# Patient Record
Sex: MALE | Race: WHITE
[De-identification: names, ages, dates, MRNs, and addresses within clinical notes are randomized per-mention and may not be internally consistent; named-entity substitution may affect disease eponyms.]

---

## 2020-09-09 ENCOUNTER — HOSPITAL ENCOUNTER (OUTPATIENT)
Dept: HOSPITAL 88 - CT | Age: 80
End: 2020-09-09
Attending: PSYCHIATRY & NEUROLOGY
Payer: MEDICARE

## 2020-09-09 DIAGNOSIS — R41.3: ICD-10-CM

## 2020-09-09 DIAGNOSIS — G89.29: ICD-10-CM

## 2020-09-09 DIAGNOSIS — Z95.0: ICD-10-CM

## 2020-09-09 DIAGNOSIS — M54.5: Primary | ICD-10-CM

## 2020-09-09 PROCEDURE — 70450 CT HEAD/BRAIN W/O DYE: CPT

## 2020-09-09 PROCEDURE — 72131 CT LUMBAR SPINE W/O DYE: CPT

## 2020-09-09 NOTE — DIAGNOSTIC IMAGING REPORT
Examination: CT head without contrast

Clinical Indication: Dizziness; balance problems; memory issues.

Technique: Transaxial noncontrast images from the skull base through the vertex

were obtained. Sagittal and coronal reformatted images were done.

Dose modulation, iterative reconstruction, and/or weight based adjustment of

the mA/kV was utilized to reduce the radiation dose to as low as reasonably

achievable. 

Comparison: Brain CT dated 10/23/2016.



Findings:



Scalp: No abnormalities.

Bones: Intact. No fractures.  No blastic or lytic lesions. 



Brain sulci: Appropriate for patient's age.

Ventricles: Normal in size and configuration. No hydrocephalus. 



Extra-axial space:

No abnormalities.



Parenchyma: 

There are patchy areas of low-attenuation within subcortical and

periventricular white matter, nonspecific, but could represent microvascular

ischemic disease.

No masses, hemorrhage, or acute or chronic cortical based vascular insults.



Suprasellar region: No abnormalities.

Craniocervical junction: The foramen magnum is patent.  No Chiari one

malformation.



Impression:



1.  No new or acute intracranial finding when compared to prior brain CT dated

10/12/2016.



2.  Unchanged chronic microvascular ischemic change.



Signed by: Dr. Angela Dyson M.D. on 9/9/2020 11:33 AM

## 2020-09-09 NOTE — DIAGNOSTIC IMAGING REPORT
Examination: CT LUMBAR SPINE WO CONTRAST



History: Chronic low back pain

Comparison studies: MRI lumbar 9/17/2009



Technique: 

Axial images were obtained through the lumbar spine from L1.

Coronal and sagittal reconstructions obtained from the axial data.

Dose modulation, iterative reconstruction, and/or weight based adjustment of

the mA/kV was utilized to reduce the radiation dose to as low as reasonably

achievable. 

Intravenous contrast: None



Findings:



The usual 5 non-rib bearing lumbar vertebral bodies are present.

Alignment: Normal lordosis.  No scoliosis.

Soft tissues: No abnormalities.

Paraspinal muscles: Unremarkable.

Sacroiliac joints: No degenerative changes.



Vertebrae:  

No fractures, infection or neoplasm.



Degenerative changes:



L1-L2:

Mild intervertebral disc space narrowing.

Diffuse disc bulge. No foraminal or canal stenosis.



L2-L3:

Diffuse disc bulge. No foraminal or canal stenosis.



L3-L4:

Diffuse disc bulge result in mild bilateral foraminal narrowing. No canal

stenosis.



L4-L5:.

Mild intervertebral disc space narrowing

Diffuse disc bulge. No foraminal or canal stenosis.



L5-S1:

Severe intervertebral disc space narrowing

Interval progression of degenerative change with diffuse disc osteophyte

complex and bilateral facet arthropathy result in moderate bilateral foraminal

narrowing. No canal stenosis.



IMPRESSION:



1.  Mild interval progression of degenerative change from L1-L2 through L5-S1

with new moderate bilateral foraminal narrowing at L5-S1. 



2.  No canal stenosis.





Signed by: Dr. Angela Dyson M.D. on 9/9/2020 11:58 AM

## 2020-09-29 ENCOUNTER — HOSPITAL ENCOUNTER (OUTPATIENT)
Dept: HOSPITAL 88 - ER | Age: 80
Discharge: HOME | End: 2020-09-29
Attending: PLASTIC SURGERY
Payer: MEDICARE

## 2020-09-29 VITALS — SYSTOLIC BLOOD PRESSURE: 136 MMHG | DIASTOLIC BLOOD PRESSURE: 85 MMHG

## 2020-09-29 VITALS — HEIGHT: 76 IN | BODY MASS INDEX: 25.82 KG/M2 | WEIGHT: 212 LBS

## 2020-09-29 DIAGNOSIS — Z95.810: ICD-10-CM

## 2020-09-29 DIAGNOSIS — Z95.9: ICD-10-CM
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
## 2020-09-29 DIAGNOSIS — I10: ICD-10-CM

## 2020-09-29 DIAGNOSIS — S62.637B: ICD-10-CM
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
## 2020-09-29 DIAGNOSIS — Z88.8: ICD-10-CM

## 2020-09-29 DIAGNOSIS — H91.90: ICD-10-CM

## 2020-09-29 DIAGNOSIS — S67.197A: Primary | ICD-10-CM
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
## 2020-09-29 DIAGNOSIS — I48.91: ICD-10-CM

## 2020-09-29 DIAGNOSIS — Z11.59: ICD-10-CM

## 2020-09-29 DIAGNOSIS — Z91.041: ICD-10-CM

## 2020-09-29 DIAGNOSIS — Z87.891: ICD-10-CM
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
## 2020-09-29 DIAGNOSIS — Z88.6: ICD-10-CM

## 2020-09-29 DIAGNOSIS — I44.0: ICD-10-CM

## 2020-09-29 LAB
ALBUMIN SERPL-MCNC: 4.3 G/DL (ref 3.5–5)
ALBUMIN/GLOB SERPL: 1.4 {RATIO} (ref 0.8–2)
ALP SERPL-CCNC: 60 IU/L (ref 40–150)
ALT SERPL-CCNC: 12 IU/L (ref 0–55)
ANION GAP SERPL CALC-SCNC: 13.6 MMOL/L (ref 8–16)
BASOPHILS # BLD AUTO: 0.1 10*3/UL (ref 0–0.1)
BASOPHILS NFR BLD AUTO: 0.8 % (ref 0–1)
BUN SERPL-MCNC: 15 MG/DL (ref 7–26)
BUN/CREAT SERPL: 14 (ref 6–25)
CALCIUM SERPL-MCNC: 9 MG/DL (ref 8.4–10.2)
CHLORIDE SERPL-SCNC: 104 MMOL/L (ref 98–107)
CO2 SERPL-SCNC: 27 MMOL/L (ref 22–29)
DEPRECATED NEUTROPHILS # BLD AUTO: 3.2 10*3/UL (ref 2.1–6.9)
EGFRCR SERPLBLD CKD-EPI 2021: > 60 ML/MIN (ref 60–?)
EOSINOPHIL # BLD AUTO: 0.3 10*3/UL (ref 0–0.4)
EOSINOPHIL NFR BLD AUTO: 4.6 % (ref 0–6)
ERYTHROCYTE [DISTWIDTH] IN CORD BLOOD: 15.1 % (ref 11.7–14.4)
GLOBULIN PLAS-MCNC: 3.1 G/DL (ref 2.3–3.5)
GLUCOSE SERPLBLD-MCNC: 127 MG/DL (ref 74–118)
HCT VFR BLD AUTO: 43.4 % (ref 38.2–49.6)
HGB BLD-MCNC: 15.1 G/DL (ref 14–18)
LYMPHOCYTES # BLD: 1.9 10*3/UL (ref 1–3.2)
LYMPHOCYTES NFR BLD AUTO: 29.5 % (ref 18–39.1)
MCH RBC QN AUTO: 32.1 PG (ref 28–32)
MCHC RBC AUTO-ENTMCNC: 34.8 G/DL (ref 31–35)
MCV RBC AUTO: 92.1 FL (ref 81–99)
MONOCYTES # BLD AUTO: 0.9 10*3/UL (ref 0.2–0.8)
MONOCYTES NFR BLD AUTO: 14.4 % (ref 4.4–11.3)
NEUTS SEG NFR BLD AUTO: 50.5 % (ref 38.7–80)
PLATELET # BLD AUTO: 192 X10E3/UL (ref 140–360)
POTASSIUM SERPL-SCNC: 3.6 MMOL/L (ref 3.5–5.1)
RBC # BLD AUTO: 4.71 X10E6/UL (ref 4.3–5.7)
SODIUM SERPL-SCNC: 141 MMOL/L (ref 136–145)

## 2020-09-29 PROCEDURE — 85025 COMPLETE CBC W/AUTO DIFF WBC: CPT

## 2020-09-29 PROCEDURE — 73140 X-RAY EXAM OF FINGER(S): CPT

## 2020-09-29 PROCEDURE — 93005 ELECTROCARDIOGRAM TRACING: CPT

## 2020-09-29 PROCEDURE — 99284 EMERGENCY DEPT VISIT MOD MDM: CPT

## 2020-09-29 PROCEDURE — 80053 COMPREHEN METABOLIC PANEL: CPT

## 2020-09-29 PROCEDURE — 90714 TD VACC NO PRESV 7 YRS+ IM: CPT

## 2020-09-29 PROCEDURE — 26765 TREAT FINGER FRACTURE EACH: CPT

## 2020-09-29 PROCEDURE — 36415 COLL VENOUS BLD VENIPUNCTURE: CPT

## 2020-09-29 PROCEDURE — 90471 IMMUNIZATION ADMIN: CPT

## 2021-04-15 ENCOUNTER — HOSPITAL ENCOUNTER (OUTPATIENT)
Dept: HOSPITAL 88 - CT | Age: 81
End: 2021-04-15
Attending: PSYCHIATRY & NEUROLOGY
Payer: MEDICARE

## 2021-04-15 DIAGNOSIS — R47.01: Primary | ICD-10-CM

## 2021-04-15 DIAGNOSIS — R41.3: ICD-10-CM

## 2021-04-15 DIAGNOSIS — R29.818: ICD-10-CM

## 2021-04-15 DIAGNOSIS — Z95.0: ICD-10-CM

## 2021-04-15 PROCEDURE — 70450 CT HEAD/BRAIN W/O DYE: CPT

## 2021-12-20 LAB
BASOPHILS # BLD AUTO: 0.1 10*3/UL (ref 0–0.1)
BASOPHILS NFR BLD AUTO: 0.9 % (ref 0–1)
DEPRECATED NEUTROPHILS # BLD AUTO: 3.8 10*3/UL (ref 2.1–6.9)
EOSINOPHIL # BLD AUTO: 0.3 10*3/UL (ref 0–0.4)
EOSINOPHIL NFR BLD AUTO: 3.5 % (ref 0–6)
ERYTHROCYTE [DISTWIDTH] IN CORD BLOOD: 14.8 % (ref 11.7–14.4)
HCT VFR BLD AUTO: 41.8 % (ref 38.2–49.6)
HGB BLD-MCNC: 14.3 G/DL (ref 14–18)
LYMPHOCYTES # BLD: 2.4 10*3/UL (ref 1–3.2)
LYMPHOCYTES NFR BLD AUTO: 31.4 % (ref 18–39.1)
MCH RBC QN AUTO: 32.5 PG (ref 28–32)
MCHC RBC AUTO-ENTMCNC: 34.2 G/DL (ref 31–35)
MCV RBC AUTO: 95 FL (ref 81–99)
MONOCYTES # BLD AUTO: 1.1 10*3/UL (ref 0.2–0.8)
MONOCYTES NFR BLD AUTO: 14.5 % (ref 4.4–11.3)
NEUTS SEG NFR BLD AUTO: 49.4 % (ref 38.7–80)
PLATELET # BLD AUTO: 183 X10E3/UL (ref 140–360)
RBC # BLD AUTO: 4.4 X10E6/UL (ref 4.3–5.7)

## 2021-12-22 ENCOUNTER — HOSPITAL ENCOUNTER (OUTPATIENT)
Dept: HOSPITAL 88 - OR | Age: 81
Discharge: HOME | End: 2021-12-22
Attending: INTERNAL MEDICINE
Payer: MEDICARE

## 2021-12-22 VITALS — DIASTOLIC BLOOD PRESSURE: 88 MMHG | SYSTOLIC BLOOD PRESSURE: 141 MMHG

## 2021-12-22 DIAGNOSIS — Z88.8: ICD-10-CM

## 2021-12-22 DIAGNOSIS — Z20.822: ICD-10-CM

## 2021-12-22 DIAGNOSIS — Z79.82: ICD-10-CM

## 2021-12-22 DIAGNOSIS — Z79.899: ICD-10-CM

## 2021-12-22 DIAGNOSIS — K31.89: ICD-10-CM

## 2021-12-22 DIAGNOSIS — H91.90: ICD-10-CM

## 2021-12-22 DIAGNOSIS — Z88.4: ICD-10-CM

## 2021-12-22 DIAGNOSIS — K22.70: ICD-10-CM

## 2021-12-22 DIAGNOSIS — K29.60: Primary | ICD-10-CM

## 2021-12-22 DIAGNOSIS — Z95.0: ICD-10-CM

## 2021-12-22 DIAGNOSIS — Z01.810: ICD-10-CM

## 2021-12-22 DIAGNOSIS — Z01.812: ICD-10-CM

## 2021-12-22 DIAGNOSIS — I48.91: ICD-10-CM

## 2021-12-22 DIAGNOSIS — I10: ICD-10-CM

## 2021-12-22 DIAGNOSIS — Z95.818: ICD-10-CM

## 2021-12-22 DIAGNOSIS — K74.60: ICD-10-CM

## 2021-12-22 DIAGNOSIS — Z91.041: ICD-10-CM

## 2021-12-22 PROCEDURE — 36415 COLL VENOUS BLD VENIPUNCTURE: CPT

## 2021-12-22 PROCEDURE — 85025 COMPLETE CBC W/AUTO DIFF WBC: CPT

## 2021-12-22 PROCEDURE — 88305 TISSUE EXAM BY PATHOLOGIST: CPT

## 2021-12-22 PROCEDURE — 88342 IMHCHEM/IMCYTCHM 1ST ANTB: CPT

## 2021-12-22 PROCEDURE — 93005 ELECTROCARDIOGRAM TRACING: CPT

## 2021-12-22 PROCEDURE — 88312 SPECIAL STAINS GROUP 1: CPT

## 2021-12-22 PROCEDURE — 43239 EGD BIOPSY SINGLE/MULTIPLE: CPT

## 2022-09-29 ENCOUNTER — HOSPITAL ENCOUNTER (OUTPATIENT)
Dept: HOSPITAL 88 - PT | Age: 82
LOS: 1 days | End: 2022-09-30
Attending: PSYCHIATRY & NEUROLOGY
Payer: MEDICARE

## 2022-09-29 DIAGNOSIS — R26.9: ICD-10-CM

## 2022-09-29 DIAGNOSIS — M47.9: ICD-10-CM

## 2022-09-29 DIAGNOSIS — M54.50: Primary | ICD-10-CM

## 2022-09-29 DIAGNOSIS — R41.3: ICD-10-CM

## 2022-10-03 ENCOUNTER — HOSPITAL ENCOUNTER (OUTPATIENT)
Dept: HOSPITAL 88 - PT | Age: 82
LOS: 28 days | End: 2022-10-31
Attending: PSYCHIATRY & NEUROLOGY
Payer: MEDICARE

## 2022-10-03 DIAGNOSIS — M54.50: Primary | ICD-10-CM

## 2022-10-03 DIAGNOSIS — Z91.81: ICD-10-CM

## 2022-10-03 DIAGNOSIS — R41.3: ICD-10-CM

## 2022-10-03 DIAGNOSIS — M47.9: ICD-10-CM

## 2022-10-03 DIAGNOSIS — R26.9: ICD-10-CM

## 2023-03-17 ENCOUNTER — HOSPITAL ENCOUNTER (OUTPATIENT)
Dept: HOSPITAL 88 - CT | Age: 83
End: 2023-03-17
Attending: PHYSICIAN ASSISTANT
Payer: MEDICARE

## 2023-03-17 DIAGNOSIS — I61.9: Primary | ICD-10-CM

## 2023-03-17 DIAGNOSIS — R29.818: ICD-10-CM

## 2023-03-17 DIAGNOSIS — R41.3: ICD-10-CM

## 2023-03-17 PROCEDURE — 70450 CT HEAD/BRAIN W/O DYE: CPT

## 2023-07-03 ENCOUNTER — HOSPITAL ENCOUNTER (OUTPATIENT)
Dept: HOSPITAL 88 - OR | Age: 83
Discharge: HOME | End: 2023-07-03
Attending: INTERNAL MEDICINE
Payer: MEDICARE

## 2023-07-03 VITALS
OXYGEN SATURATION: 96 % | DIASTOLIC BLOOD PRESSURE: 86 MMHG | HEART RATE: 60 BPM | RESPIRATION RATE: 18 BRPM | SYSTOLIC BLOOD PRESSURE: 154 MMHG

## 2023-07-03 DIAGNOSIS — R63.0: ICD-10-CM

## 2023-07-03 DIAGNOSIS — Z79.899: ICD-10-CM

## 2023-07-03 DIAGNOSIS — Z92.3: ICD-10-CM

## 2023-07-03 DIAGNOSIS — Z01.810: ICD-10-CM

## 2023-07-03 DIAGNOSIS — I25.10: ICD-10-CM

## 2023-07-03 DIAGNOSIS — F03.90: ICD-10-CM

## 2023-07-03 DIAGNOSIS — Z91.041: ICD-10-CM

## 2023-07-03 DIAGNOSIS — R63.4: ICD-10-CM

## 2023-07-03 DIAGNOSIS — K20.90: ICD-10-CM

## 2023-07-03 DIAGNOSIS — Z85.72: ICD-10-CM

## 2023-07-03 DIAGNOSIS — I48.91: ICD-10-CM

## 2023-07-03 DIAGNOSIS — Z88.8: ICD-10-CM

## 2023-07-03 DIAGNOSIS — Z95.0: ICD-10-CM

## 2023-07-03 DIAGNOSIS — Z95.818: ICD-10-CM

## 2023-07-03 DIAGNOSIS — E78.5: ICD-10-CM

## 2023-07-03 DIAGNOSIS — R19.7: ICD-10-CM

## 2023-07-03 DIAGNOSIS — Z86.010: ICD-10-CM

## 2023-07-03 DIAGNOSIS — I10: ICD-10-CM

## 2023-07-03 DIAGNOSIS — Z79.02: ICD-10-CM

## 2023-07-03 DIAGNOSIS — K29.50: Primary | ICD-10-CM

## 2023-07-03 DIAGNOSIS — E03.9: ICD-10-CM

## 2023-07-03 PROCEDURE — 88342 IMHCHEM/IMCYTCHM 1ST ANTB: CPT

## 2023-07-03 PROCEDURE — 93005 ELECTROCARDIOGRAM TRACING: CPT

## 2023-07-03 PROCEDURE — 43239 EGD BIOPSY SINGLE/MULTIPLE: CPT

## 2023-07-03 PROCEDURE — 88305 TISSUE EXAM BY PATHOLOGIST: CPT
